# Patient Record
Sex: FEMALE | Race: WHITE | URBAN - METROPOLITAN AREA
[De-identification: names, ages, dates, MRNs, and addresses within clinical notes are randomized per-mention and may not be internally consistent; named-entity substitution may affect disease eponyms.]

---

## 2022-03-19 PROBLEM — R00.2 PALPITATION: Status: ACTIVE | Noted: 2020-11-13

## 2022-03-19 PROBLEM — E66.01 MORBID OBESITY (HCC): Status: ACTIVE | Noted: 2020-11-13

## 2022-03-19 PROBLEM — I47.1 PSVT (PAROXYSMAL SUPRAVENTRICULAR TACHYCARDIA) (HCC): Status: ACTIVE | Noted: 2021-01-15

## 2022-03-19 PROBLEM — R06.02 SOB (SHORTNESS OF BREATH) ON EXERTION: Status: ACTIVE | Noted: 2020-11-13

## 2022-03-19 PROBLEM — I10 ESSENTIAL HYPERTENSION: Status: ACTIVE | Noted: 2020-11-13

## 2022-03-19 PROBLEM — R01.1 MURMUR: Status: ACTIVE | Noted: 2020-11-13

## 2022-03-19 PROBLEM — I47.10 PSVT (PAROXYSMAL SUPRAVENTRICULAR TACHYCARDIA): Status: ACTIVE | Noted: 2021-01-15

## 2022-03-19 PROBLEM — R06.09 DOE (DYSPNEA ON EXERTION): Status: ACTIVE | Noted: 2020-11-13

## 2022-03-19 PROBLEM — R53.82 CHRONIC FATIGUE: Status: ACTIVE | Noted: 2020-11-13

## 2022-03-20 PROBLEM — I42.2 HYPERTROPHIC CARDIOMYOPATHY (HCC): Status: ACTIVE | Noted: 2021-01-15

## 2023-06-15 PROBLEM — I27.20 PULMONARY HYPERTENSION (HCC): Status: ACTIVE | Noted: 2023-06-15

## 2023-07-13 ENCOUNTER — TELEPHONE (OUTPATIENT)
Age: 48
End: 2023-07-13

## 2023-07-13 NOTE — TELEPHONE ENCOUNTER
Per Dr. Reji Keating as long as swelling does not increase or becomes sob should be ok to proceed with procedure on Monday. Called pt and informed of Dr. Aravind Doll response. Instructed pt if swelling should increase or becomes short of breath to please call the office tomorrow or speak with the on call doctor over the weekend. Pt voiced understanding. Pt then stated she has not heard from pre op concerning her procedure. Informed if she has not heard from pre op by the middle of the afternoon tomorrow to please call.   Pt agreed to do so./wc

## 2023-07-17 ENCOUNTER — HOSPITAL ENCOUNTER (OUTPATIENT)
Dept: CARDIAC CATH/INVASIVE PROCEDURES | Age: 48
Discharge: HOME | End: 2023-07-17
Attending: INTERNAL MEDICINE | Admitting: INTERNAL MEDICINE

## 2023-07-17 VITALS
SYSTOLIC BLOOD PRESSURE: 104 MMHG | HEIGHT: 66 IN | BODY MASS INDEX: 47.09 KG/M2 | HEART RATE: 72 BPM | DIASTOLIC BLOOD PRESSURE: 63 MMHG | OXYGEN SATURATION: 98 % | TEMPERATURE: 98 F | WEIGHT: 293 LBS | RESPIRATION RATE: 18 BRPM

## 2023-07-17 DIAGNOSIS — I27.20 PULMONARY HYPERTENSION (HCC): ICD-10-CM

## 2023-07-17 DIAGNOSIS — R01.1 MURMUR: ICD-10-CM

## 2023-07-17 DIAGNOSIS — I42.2 HYPERTROPHIC CARDIOMYOPATHY (HCC): ICD-10-CM

## 2023-07-17 LAB
ANION GAP SERPL CALC-SCNC: 4 MMOL/L (ref 2–11)
BASOPHILS # BLD: 0 K/UL (ref 0–0.2)
BASOPHILS NFR BLD: 1 % (ref 0–2)
BUN SERPL-MCNC: 9 MG/DL (ref 6–23)
CALCIUM SERPL-MCNC: 9.1 MG/DL (ref 8.3–10.4)
CHLORIDE SERPL-SCNC: 101 MMOL/L (ref 101–110)
CO2 SERPL-SCNC: 31 MMOL/L (ref 21–32)
CREAT SERPL-MCNC: 0.6 MG/DL (ref 0.6–1)
DIFFERENTIAL METHOD BLD: ABNORMAL
EOSINOPHIL # BLD: 0.4 K/UL (ref 0–0.8)
EOSINOPHIL NFR BLD: 4 % (ref 0.5–7.8)
ERYTHROCYTE [DISTWIDTH] IN BLOOD BY AUTOMATED COUNT: 15.2 % (ref 11.9–14.6)
GLUCOSE SERPL-MCNC: 93 MG/DL (ref 65–100)
HCT VFR BLD AUTO: 35.2 % (ref 35.8–46.3)
HGB BLD-MCNC: 10.5 G/DL (ref 11.7–15.4)
IMM GRANULOCYTES # BLD AUTO: 0 K/UL (ref 0–0.5)
IMM GRANULOCYTES NFR BLD AUTO: 0 % (ref 0–5)
LYMPHOCYTES # BLD: 1.2 K/UL (ref 0.5–4.6)
LYMPHOCYTES NFR BLD: 15 % (ref 13–44)
MAGNESIUM SERPL-MCNC: 1.8 MG/DL (ref 1.8–2.4)
MCH RBC QN AUTO: 22.6 PG (ref 26.1–32.9)
MCHC RBC AUTO-ENTMCNC: 29.8 G/DL (ref 31.4–35)
MCV RBC AUTO: 75.7 FL (ref 82–102)
MONOCYTES # BLD: 0.4 K/UL (ref 0.1–1.3)
MONOCYTES NFR BLD: 5 % (ref 4–12)
NEUTS SEG # BLD: 6.3 K/UL (ref 1.7–8.2)
NEUTS SEG NFR BLD: 75 % (ref 43–78)
NRBC # BLD: 0 K/UL (ref 0–0.2)
PLATELET # BLD AUTO: 267 K/UL (ref 150–450)
PMV BLD AUTO: 8.8 FL (ref 9.4–12.3)
POTASSIUM SERPL-SCNC: 3.5 MMOL/L (ref 3.5–5.1)
RBC # BLD AUTO: 4.65 M/UL (ref 4.05–5.2)
SODIUM SERPL-SCNC: 136 MMOL/L (ref 133–143)
WBC # BLD AUTO: 8.4 K/UL (ref 4.3–11.1)

## 2023-07-17 PROCEDURE — 93325 DOPPLER ECHO COLOR FLOW MAPG: CPT

## 2023-07-17 PROCEDURE — 83735 ASSAY OF MAGNESIUM: CPT

## 2023-07-17 PROCEDURE — 80048 BASIC METABOLIC PNL TOTAL CA: CPT

## 2023-07-17 PROCEDURE — 6370000000 HC RX 637 (ALT 250 FOR IP): Performed by: INTERNAL MEDICINE

## 2023-07-17 PROCEDURE — 6360000002 HC RX W HCPCS: Performed by: INTERNAL MEDICINE

## 2023-07-17 PROCEDURE — 99152 MOD SED SAME PHYS/QHP 5/>YRS: CPT

## 2023-07-17 PROCEDURE — 85025 COMPLETE CBC W/AUTO DIFF WBC: CPT

## 2023-07-17 RX ORDER — SODIUM CHLORIDE 9 MG/ML
INJECTION, SOLUTION INTRAVENOUS CONTINUOUS
Status: DISCONTINUED | OUTPATIENT
Start: 2023-07-17 | End: 2023-07-17 | Stop reason: HOSPADM

## 2023-07-17 RX ORDER — MIDAZOLAM HYDROCHLORIDE 1 MG/ML
INJECTION INTRAMUSCULAR; INTRAVENOUS PRN
Status: COMPLETED | OUTPATIENT
Start: 2023-07-17 | End: 2023-07-17

## 2023-07-17 RX ORDER — LIDOCAINE HYDROCHLORIDE 20 MG/ML
SOLUTION OROPHARYNGEAL PRN
Status: COMPLETED | OUTPATIENT
Start: 2023-07-17 | End: 2023-07-17

## 2023-07-17 RX ORDER — LOSARTAN POTASSIUM 25 MG/1
25 TABLET ORAL DAILY
COMMUNITY

## 2023-07-17 RX ORDER — FENTANYL CITRATE 50 UG/ML
INJECTION, SOLUTION INTRAMUSCULAR; INTRAVENOUS PRN
Status: COMPLETED | OUTPATIENT
Start: 2023-07-17 | End: 2023-07-17

## 2023-07-17 RX ADMIN — MIDAZOLAM 1 MG: 1 INJECTION INTRAMUSCULAR; INTRAVENOUS at 12:32

## 2023-07-17 RX ADMIN — MIDAZOLAM 2 MG: 1 INJECTION INTRAMUSCULAR; INTRAVENOUS at 12:30

## 2023-07-17 RX ADMIN — LIDOCAINE HYDROCHLORIDE 15 ML: 20 SOLUTION ORAL at 12:28

## 2023-07-17 RX ADMIN — FENTANYL CITRATE 25 MCG: 50 INJECTION, SOLUTION INTRAMUSCULAR; INTRAVENOUS at 12:30

## 2023-07-17 ASSESSMENT — ENCOUNTER SYMPTOMS
RESPIRATORY NEGATIVE: 1
COUGH: 0
SHORTNESS OF BREATH: 0

## 2023-07-17 NOTE — PROGRESS NOTES
Discharge instructions given per orders, voiced good understanding of post LEATHA care, medications & follow up care. Denies any questions. Patient wheeled out to private vehicle via RN.

## 2023-07-17 NOTE — PROGRESS NOTES
LEATHA complete with   Sedation start 1228  Sedation end 1242  3mg of Versed  25mcg of Fentanyl used for sedation  Numbed with viscous lidocaine 1228

## 2023-07-17 NOTE — DISCHARGE INSTRUCTIONS
Nothing to eat or drink until 2:28 today, start with sips of water and advance as tolerated. Transesophageal Echocardiogram: What to Expect at [address]  A transesophageal echocardiogram is a test to help your doctor look at the inside of your heart. A small device called a transducer directs sound waves toward your heart. The sound waves make a picture of the heart's valves and chambers. Before the test, your throat was sprayed with medicine to numb it. Your throat may be sore for a few days. You may have had a sedative to help you relax. You may be unsteady after having sedation. It can take a few hours for the medicine's effects to wear off. Common side effects include nausea, vomiting, and feeling sleepy or tired. This care sheet gives you a general idea about how long it will take for you to recover. But each person recovers at a different pace. Follow the steps below to feel better as quickly as possible. How can you care for yourself at home? Activity    If a sedative was used, your doctor will tell you when it is safe for you to do your normal activities. For your safety, do not drive or operate any machinery that could be dangerous. Wait until the medicine wears off and you can think clearly and react easily. Diet    Do not eat or drink until the numbness in your throat wears off. When the numbness is gone, you can eat your normal diet. Throat lozenges and warm saltwater gargles can help relieve throat soreness. Throat lozenges can be used by people age 3 or older. And most people can gargle at age 6 and older. Do not drink alcohol for 24 hours. Follow-up care is a key part of your treatment and safety. Be sure to make and go to all appointments, and call your doctor if you are having problems. It's also a good idea to know your test results and keep a list of the medicines you take. When should you call for help?    Call 911 anytime you think you may need emergency

## 2023-07-17 NOTE — H&P
[address], [address] [place] [address], [address] [place] [address]      Patient:  Elvia Whatley  1975       SUBJECTIVE:  Elvia Whatley is a  57 y.o. female seen for the following:     No chief complaint on file. CC: atrial fibrillation     HPI:       Tad Ortiz is a 57 y.o. female seen for a follow up visit regarding the following: The patient has a hx of PSVT,PVC,and mild HOCM. She returns for annual follow up Echo on 5/30/23 described LV EF=70-75%,diastolic dysfunction,LVOT gradient (mean =19 and peak is 33 with ?subvalvular AS),trace MS,mild to moderate TR with RVSP=50 as per most recent visit to Dr. Rebeka Conway. Today denies chest pain, shortness of breath, difficulty swallowing. Scheduled for outpatient LEATHA. No other complaints at this time. Past medical history, past surgical history, family history, social history, and medications were all reviewed with the patient today and updated as necessary. Patient Active Problem List   Diagnosis    PSVT (paroxysmal supraventricular tachycardia) (HCC)    Essential hypertension    Morbid obesity (HCC)    SOB (shortness of breath) on exertion    Palpitation    MONACO (dyspnea on exertion)    Murmur    Chronic fatigue    Hypertrophic cardiomyopathy (HCC)    Pulmonary hypertension ([address])         Family History   Problem Relation Age of Onset    Hypertension Mother     Diabetes Mother     Endometriosis Mother     Endometriosis Maternal Aunt     Endometriosis Sister     Endometriosis Maternal Aunt     Diabetes Father        Social History     Tobacco Use    Smoking status: Never    Smokeless tobacco: Never   Substance Use Topics    Alcohol use: Not Currently       Review of Systems   Cardiovascular:  Negative for chest pain. Respiratory: Negative. Negative for cough and shortness of breath. Gastrointestinal:         No difficulty swallowing food or liquids.     All other systems reviewed and are

## 2023-07-17 NOTE — PROGRESS NOTES
Patient received to [facility] room # 15. Ambulatory from [facility]. Patient scheduled for LEATHA today with Dr Zaina Loya. Procedure reviewed & questions answered, voiced good understanding consent obtained & placed on chart. All medications and medical history reviewed. Will prep patient per orders. Patient & family updated on plan of care. The patient has a fraility score of 3-MANAGING WELL, based on pt ability to ambulate independently and to complete own ADLs.

## 2023-07-17 NOTE — PROGRESS NOTES
Transesophageal Echo Note  - Indication: TR, possible subvalvular aortic stenosis. - pt underwent successful LEATHA today in cath holding  - start 1228  - stop 1242  - sedation: 3 mg IV Midazolam, 25 mcg Fentanyl IV given by Margo Drew RN under my direct supervision. Direct monitoring of vital signs and respiratory status throughout the procedure. - An independent trained observer pushed medications at my direction. We monitored the patient's level of consciousness and vital signs/physiologic status throughout the procedure duration (see start and stop times above). - No complications, pt in stable condition  - LEATHA Brief Findings: Small tissue remnant in the LVOT which does not appear to be causing any significant LVOT obstruction, mild TR.  LVEF appears preserved. 3D images performed. - Complete LEATHA report to follow. - OK for oral intake at 1442.  - No driving or heavy machine operation today.    - Results discussed with patient and family at bedside

## 2023-07-18 LAB
ECHO BSA: 2.8 M2
ECHO TV REGURGITANT MAX VELOCITY: 3.08 M/S
ECHO TV REGURGITANT PEAK GRADIENT: 38 MMHG

## 2023-07-19 ENCOUNTER — TELEPHONE (OUTPATIENT)
Age: 48
End: 2023-07-19

## 2023-07-19 NOTE — TELEPHONE ENCOUNTER
Pt c/o sharp, stabbing pain starting under L arm and radiating toward L breast.  Comes and goes, but coming more frequently. Denies sob, light headedness, sweating, nausea. No worse with exertion, movement or deep breath. HR feels normal. Has not had breakfast.   Informed pt sharp pain not usually cardiac. Monitor sx. To ER for worsening sx or not relieved. Pt voiced understanding and agreement with POC.  cgh

## 2023-09-13 ENCOUNTER — OFFICE VISIT (OUTPATIENT)
Age: 48
End: 2023-09-13
Payer: COMMERCIAL

## 2023-09-13 VITALS
BODY MASS INDEX: 47.09 KG/M2 | HEART RATE: 85 BPM | HEIGHT: 66 IN | WEIGHT: 293 LBS | DIASTOLIC BLOOD PRESSURE: 82 MMHG | SYSTOLIC BLOOD PRESSURE: 146 MMHG

## 2023-09-13 DIAGNOSIS — I47.1 PSVT (PAROXYSMAL SUPRAVENTRICULAR TACHYCARDIA) (HCC): ICD-10-CM

## 2023-09-13 DIAGNOSIS — I10 ESSENTIAL HYPERTENSION: ICD-10-CM

## 2023-09-13 PROCEDURE — 3079F DIAST BP 80-89 MM HG: CPT | Performed by: INTERNAL MEDICINE

## 2023-09-13 PROCEDURE — 3077F SYST BP >= 140 MM HG: CPT | Performed by: INTERNAL MEDICINE

## 2023-09-13 PROCEDURE — 99214 OFFICE O/P EST MOD 30 MIN: CPT | Performed by: INTERNAL MEDICINE

## 2023-09-13 RX ORDER — LOSARTAN POTASSIUM 25 MG/1
25 TABLET ORAL DAILY
Qty: 90 TABLET | Refills: 3 | Status: SHIPPED | OUTPATIENT
Start: 2023-09-13

## 2023-09-13 RX ORDER — FUROSEMIDE 20 MG/1
20 TABLET ORAL DAILY
Qty: 90 TABLET | Refills: 3 | Status: CANCELLED | OUTPATIENT
Start: 2023-09-13

## 2023-09-13 RX ORDER — VERAPAMIL HYDROCHLORIDE 240 MG/1
240 TABLET, FILM COATED, EXTENDED RELEASE ORAL DAILY
Qty: 30 TABLET | Refills: 5 | Status: SHIPPED | OUTPATIENT
Start: 2023-09-13

## 2023-09-13 RX ORDER — CHLORTHALIDONE 25 MG/1
25 TABLET ORAL DAILY
COMMUNITY
Start: 2023-07-01

## 2023-09-13 RX ORDER — CETIRIZINE HYDROCHLORIDE 10 MG/1
10 TABLET ORAL DAILY
COMMUNITY

## 2023-09-13 RX ORDER — DILTIAZEM HYDROCHLORIDE 240 MG/1
240 CAPSULE, EXTENDED RELEASE ORAL DAILY
Qty: 90 CAPSULE | Refills: 3 | Status: CANCELLED | OUTPATIENT
Start: 2023-09-13

## 2023-09-13 ASSESSMENT — ENCOUNTER SYMPTOMS
COUGH: 0
BLURRED VISION: 0
HOARSE VOICE: 0
HEMATOCHEZIA: 0
COLOR CHANGE: 0
VOMITING: 0
SHORTNESS OF BREATH: 0
HEMATEMESIS: 0
BOWEL INCONTINENCE: 0
SORE THROAT: 0
SPUTUM PRODUCTION: 0
WHEEZING: 0
ABDOMINAL PAIN: 0
NAUSEA: 0
ORTHOPNEA: 0
SWOLLEN GLANDS: 0
CHANGE IN BOWEL HABIT: 0
VISUAL CHANGE: 0
DIARRHEA: 0

## 2023-09-13 NOTE — PROGRESS NOTES
[facility] CARDIOLOGY  [id number] [place] [address], [address] [facility]  PHONE: [phone]        23        NAME:  Severo Samayoa  : 1975  MRN: [id number]       SUBJECTIVE:   Severo Samayoa is a 57 y.o. female seen for a follow up visit regarding the following: The patient has a hx of PSVT and PVC's. She was thought to have a mild form of HOCM. Echo in  suggested possible subvalvular AS. Follow up LEATHA showed normal LV EF,no significant LVOT obstruction at rest,probable small embryologic tissue remnant in at LVOT,and mild MR & TR. She returns for follow up. Overall,she reports feeling much better with near complete resolution of palpitations on Tiazac. Unfortunately,since adding Diltiazem,she has experienced a pruritic rash on her legs. Chief Complaint   Patient presents with    Tachycardia       HPI:    Tachycardia  The problem has been resolved. Associated symptoms include a rash. Pertinent negatives include no abdominal pain, anorexia, arthralgias, change in bowel habit, chest pain, chills, congestion, coughing, diaphoresis, fatigue, fever, headaches, joint swelling, myalgias, nausea, neck pain, numbness, sore throat, swollen glands, urinary symptoms, vertigo, visual change, vomiting or weakness. Past Medical History, Past Surgical History, Family history, Social History, and Medications were all reviewed with the patient today and updated as necessary.          Current Outpatient Medications:     losartan (COZAAR) 25 MG tablet, Take 1 tablet by mouth daily, Disp: 90 tablet, Rfl: 3    chlorthalidone (HYGROTON) 25 MG tablet, Take 1 tablet by mouth daily, Disp: , Rfl:     cetirizine (ZYRTEC) 10 MG tablet, Take 1 tablet by mouth daily, Disp: , Rfl:     verapamil (CALAN SR) 240 MG extended release tablet, Take 1 tablet by mouth daily, Disp: 30 tablet, Rfl: 5    dulaglutide (TRULICITY) 1.5 TN/9.3OM SC injection, Inject 0.5 mLs into the skin once a week, Disp: 2 mL, Rfl: 2

## 2024-03-07 ENCOUNTER — OFFICE VISIT (OUTPATIENT)
Dept: OBGYN CLINIC | Age: 49
End: 2024-03-07
Payer: COMMERCIAL

## 2024-03-07 VITALS
BODY MASS INDEX: 47.09 KG/M2 | HEIGHT: 66 IN | DIASTOLIC BLOOD PRESSURE: 86 MMHG | WEIGHT: 293 LBS | SYSTOLIC BLOOD PRESSURE: 138 MMHG

## 2024-03-07 DIAGNOSIS — N63.15 BREAST LUMP ON RIGHT SIDE AT 3 O'CLOCK POSITION: ICD-10-CM

## 2024-03-07 DIAGNOSIS — N64.4 BREAST PAIN: Primary | ICD-10-CM

## 2024-03-07 PROCEDURE — 3075F SYST BP GE 130 - 139MM HG: CPT | Performed by: NURSE PRACTITIONER

## 2024-03-07 PROCEDURE — 99203 OFFICE O/P NEW LOW 30 MIN: CPT | Performed by: NURSE PRACTITIONER

## 2024-03-07 PROCEDURE — 3079F DIAST BP 80-89 MM HG: CPT | Performed by: NURSE PRACTITIONER

## 2024-03-07 NOTE — PROGRESS NOTES
Cassie  is a 49 y.o. No obstetric history on file.  who presents today for lump in the right breast.  Pt notes lump appeared a couple of weeks ago. States was hot to the touch, very sensitive/tender. Denies discharge from nipple. Denies redness/streaking/dimpling. Denies nipple inversion. Underneath her breast is also very sore and very tender. Has trouble visualizing, so is not sure if redness/streaking. Denies any trauma. Has changed bras but no improvements. Denies family h/o breast cancer.       Patient's last menstrual period was 02/28/2024 (exact date).   Last mammogram: 05/19/2023 The breasts are almost entirely fatty.     Personal Breast History:  none   Family Breast History:  none   Social History     Tobacco Use    Smoking status: Never    Smokeless tobacco: Never   Substance Use Topics    Alcohol use: Not Currently           HISTORY:    Current Outpatient Medications   Medication Sig Dispense Refill    losartan (COZAAR) 25 MG tablet Take 1 tablet by mouth daily 90 tablet 3    chlorthalidone (HYGROTON) 25 MG tablet Take 1 tablet by mouth daily      cetirizine (ZYRTEC) 10 MG tablet Take 1 tablet by mouth daily      verapamil (CALAN SR) 240 MG extended release tablet Take 1 tablet by mouth daily 30 tablet 5    metFORMIN (GLUCOPHAGE) 500 MG tablet Take 1 tablet by mouth daily      escitalopram (LEXAPRO) 20 MG tablet Take 1 tablet by mouth daily      albuterol sulfate  (90 Base) MCG/ACT inhaler Inhale into the lungs      montelukast (SINGULAIR) 10 MG tablet Take 1 tablet by mouth daily      dulaglutide (TRULICITY) 1.5 MG/0.5ML SC injection Inject 0.5 mLs into the skin once a week 2 mL 2     No current facility-administered medications for this visit.         ROS:  Gyn ROS: see above    PHYSICAL EXAM:  Blood pressure 138/86, height 1.676 m (5' 6\"), weight (!) 173.8 kg (383 lb 1.6 oz), last menstrual period 02/28/2024.  The patient appears well, alert, oriented x 3, in no distress. Normal thought 
this encounter.  - ***  - SBE techniques reviewed, encouraged and taught.    No follow-up provider specified.      Supervising physician is Dr. Wallace.     Greater than 50% of this *** minute visit was spent in counseling to the above related topics.

## 2024-03-18 DIAGNOSIS — I47.10 PSVT (PAROXYSMAL SUPRAVENTRICULAR TACHYCARDIA): ICD-10-CM

## 2024-03-18 DIAGNOSIS — I10 ESSENTIAL HYPERTENSION: ICD-10-CM

## 2024-03-18 RX ORDER — VERAPAMIL HYDROCHLORIDE 240 MG/1
240 TABLET, FILM COATED, EXTENDED RELEASE ORAL DAILY
Qty: 30 TABLET | Refills: 5 | Status: SHIPPED | OUTPATIENT
Start: 2024-03-18

## 2024-03-18 NOTE — TELEPHONE ENCOUNTER
Pt has double pink eye and had to reschedule her ov with Dr. Todd to April. Pt states she will run out of her verapamil er by then and asks for refills to be sent to the [facility] in [place].

## 2024-04-05 ENCOUNTER — HOSPITAL ENCOUNTER (OUTPATIENT)
Dept: MAMMOGRAPHY | Age: 49
Discharge: HOME | End: 2024-04-05
Payer: COMMERCIAL

## 2024-04-05 ENCOUNTER — HOSPITAL ENCOUNTER (OUTPATIENT)
Dept: MAMMOGRAPHY | Age: 49
End: 2024-04-05
Payer: COMMERCIAL

## 2024-04-05 VITALS — BODY MASS INDEX: 47.09 KG/M2 | WEIGHT: 293 LBS | HEIGHT: 66 IN

## 2024-04-05 DIAGNOSIS — N63.15 BREAST LUMP ON RIGHT SIDE AT 3 O'CLOCK POSITION: ICD-10-CM

## 2024-04-05 DIAGNOSIS — N64.4 BREAST PAIN: ICD-10-CM

## 2024-04-05 PROCEDURE — 76642 ULTRASOUND BREAST LIMITED: CPT

## 2024-04-05 PROCEDURE — 77066 DX MAMMO INCL CAD BI: CPT
